# Patient Record
Sex: FEMALE | URBAN - METROPOLITAN AREA
[De-identification: names, ages, dates, MRNs, and addresses within clinical notes are randomized per-mention and may not be internally consistent; named-entity substitution may affect disease eponyms.]

---

## 2024-12-02 ENCOUNTER — APPOINTMENT (RX ONLY)
Dept: URBAN - METROPOLITAN AREA CLINIC 145 | Facility: CLINIC | Age: 59
Setting detail: DERMATOLOGY
End: 2024-12-02

## 2024-12-02 DIAGNOSIS — Z41.9 ENCOUNTER FOR PROCEDURE FOR PURPOSES OTHER THAN REMEDYING HEALTH STATE, UNSPECIFIED: ICD-10-CM

## 2024-12-02 PROCEDURE — ? LASER HAIR REMOVAL

## 2024-12-02 NOTE — PROCEDURE: LASER HAIR REMOVAL
Post-Procedure Care: Advised to avoid sun exposure, hot tub, sauna,or hot bath.
Number Of Prepaid Treatments (Will Not Render If 0): 0
Detail Level: Detailed
Render Post-Care In The Note: No
Device Serial Number (Optional): [id number]
Laser Type: diode 810nm
Fluence (Will Not Render If 0): 8
Fluence (Will Not Render If 0): 11
Fluence (Will Not Render If 0): 5
Eye Shield Text: Given the treatment area eye shields were inserted prior to treatment.
Pulse Duration (Include Units): 50
Cooling Override: 4C
Consent: Written consent obtained, risks reviewed including but not limited to crusting, scabbing, blistering, scarring, darker or lighter pigmentary change, paradoxical hair regrowth, incomplete removal of hair and infection.
Pulse Duration (Include Units): 70
Pulse Duration (Include Units): 100
Pre-Procedure: Prior to proceeding the treatment areas were cleaned and all present put on their eye protection.
Price (Use Numbers Only, No Special Characters Or $): 200
Post-Care Instructions: I reviewed with the patient in detail post-care instructions. Patient should avoid sun for a minimum of 4 weeks before and after treatment. Advised patient to avoid heat.

## 2025-01-06 ENCOUNTER — APPOINTMENT (OUTPATIENT)
Dept: URBAN - METROPOLITAN AREA CLINIC 145 | Facility: CLINIC | Age: 60
Setting detail: DERMATOLOGY
End: 2025-01-06

## 2025-01-06 DIAGNOSIS — Z41.9 ENCOUNTER FOR PROCEDURE FOR PURPOSES OTHER THAN REMEDYING HEALTH STATE, UNSPECIFIED: ICD-10-CM

## 2025-01-06 PROCEDURE — ? LASER HAIR REMOVAL

## 2025-01-06 ASSESSMENT — LOCATION SIMPLE DESCRIPTION DERM: LOCATION SIMPLE: LEFT LIP

## 2025-01-06 ASSESSMENT — LOCATION ZONE DERM: LOCATION ZONE: LIP

## 2025-01-06 ASSESSMENT — LOCATION DETAILED DESCRIPTION DERM: LOCATION DETAILED: LEFT UPPER CUTANEOUS LIP

## 2025-01-06 NOTE — PROCEDURE: LASER HAIR REMOVAL
Pre-Procedure: Prior to proceeding the treatment areas were cleaned and all present put on their eye protection.
Cooling Override: 4C
Number Of Prepaid Treatments (Will Not Render If 0): 0
Treatment Number: 2
Fluence (Will Not Render If 0): 11
Fluence (Will Not Render If 0): 5
Eye Shield Text: Given the treatment area eye shields were inserted prior to treatment.
Fluence (Will Not Render If 0): 8
Render Post-Care In The Note: No
Consent: Written consent obtained, risks reviewed including but not limited to crusting, scabbing, blistering, scarring, darker or lighter pigmentary change, paradoxical hair regrowth, incomplete removal of hair and infection.
Post-Procedure Care: Advised to avoid sun exposure, hot tub, sauna,or hot bath.
Pulse Duration (Include Units): 70
Pulse Duration (Include Units): 50
Pulse Duration (Include Units): 100
Price (Use Numbers Only, No Special Characters Or $): 200
Laser Type: diode 810nm
Detail Level: Detailed
Post-Care Instructions: I reviewed with the patient in detail post-care instructions. Patient should avoid sun for a minimum of 4 weeks before and after treatment. Advised patient to avoid heat.
Device Serial Number (Optional): [id number]

## 2025-02-17 ENCOUNTER — APPOINTMENT (OUTPATIENT)
Dept: URBAN - METROPOLITAN AREA CLINIC 145 | Facility: CLINIC | Age: 60
Setting detail: DERMATOLOGY
End: 2025-02-17

## 2025-02-17 DIAGNOSIS — Z41.9 ENCOUNTER FOR PROCEDURE FOR PURPOSES OTHER THAN REMEDYING HEALTH STATE, UNSPECIFIED: ICD-10-CM

## 2025-02-17 PROCEDURE — ? LASER HAIR REMOVAL

## 2025-02-17 NOTE — PROCEDURE: LASER HAIR REMOVAL
Statement Selected
Were Eye Shields Employed?: No
Laser Type: diode 810nm
Device Serial Number (Optional): [id number]
Post-Care Instructions: I reviewed with the patient in detail post-care instructions. Patient should avoid sun for a minimum of 4 weeks before and after treatment. Advised patient to avoid heat.
Detail Level: Detailed
Treatment Number: 0
Fluence (Will Not Render If 0): 5
Eye Shield Text: Given the treatment area eye shields were inserted prior to treatment.
Pre-Procedure: Prior to proceeding the treatment areas were cleaned and all present put on their eye protection.
Cooling Override: 4C
Fluence (Will Not Render If 0): 8
Fluence (Will Not Render If 0): 11
Pulse Duration (Include Units): 50
Post-Procedure Care: Advised to avoid sun exposure, hot tub, sauna,or hot bath.
Pulse Duration (Include Units): 70
Consent: Written consent obtained, risks reviewed including but not limited to crusting, scabbing, blistering, scarring, darker or lighter pigmentary change, paradoxical hair regrowth, incomplete removal of hair and infection.
Price (Use Numbers Only, No Special Characters Or $): 200
Pulse Duration (Include Units): 100